# Patient Record
Sex: FEMALE | Race: WHITE | Employment: FULL TIME | URBAN - METROPOLITAN AREA
[De-identification: names, ages, dates, MRNs, and addresses within clinical notes are randomized per-mention and may not be internally consistent; named-entity substitution may affect disease eponyms.]

---

## 2023-10-25 PROBLEM — R53.83 FATIGUE: Status: ACTIVE | Noted: 2023-10-25

## 2023-10-25 PROBLEM — O02.1 MISSED ABORTION (HHS-HCC): Status: ACTIVE | Noted: 2023-10-25

## 2023-10-25 PROBLEM — R63.2 POLYPHAGIA: Status: ACTIVE | Noted: 2023-10-25

## 2023-10-25 PROBLEM — G47.8 SLEEP PARALYSIS: Status: ACTIVE | Noted: 2023-10-25

## 2023-10-25 PROBLEM — F41.8 MIXED ANXIETY DEPRESSIVE DISORDER: Status: ACTIVE | Noted: 2023-10-25

## 2023-10-25 PROBLEM — F17.200 TOBACCO USE DISORDER: Status: ACTIVE | Noted: 2023-10-25

## 2023-10-25 PROBLEM — R41.840 INATTENTION: Status: ACTIVE | Noted: 2023-10-25

## 2023-10-25 PROBLEM — R41.840 POOR CONCENTRATION: Status: ACTIVE | Noted: 2023-10-25

## 2023-10-25 PROBLEM — E55.9 VITAMIN D DEFICIENCY: Status: ACTIVE | Noted: 2023-10-25

## 2023-10-25 PROBLEM — E66.9 OBESITY (BMI 30-39.9): Status: ACTIVE | Noted: 2023-10-25

## 2023-10-25 RX ORDER — LEVONORGESTREL 52 MG/1
INTRAUTERINE DEVICE INTRAUTERINE
COMMUNITY

## 2023-10-25 RX ORDER — SERTRALINE HYDROCHLORIDE 100 MG/1
TABLET, FILM COATED ORAL
COMMUNITY
Start: 2022-10-09 | End: 2023-10-26 | Stop reason: WASHOUT

## 2023-10-25 RX ORDER — METHYLPHENIDATE HYDROCHLORIDE 20 MG/1
CAPSULE, EXTENDED RELEASE ORAL
COMMUNITY
Start: 2023-01-24

## 2023-10-25 RX ORDER — DOCUSATE SODIUM 100 MG/1
CAPSULE, LIQUID FILLED ORAL
COMMUNITY

## 2023-10-25 RX ORDER — BENZOCAINE AND LEVOMENTHOL 200; 5 MG/G; MG/G
SPRAY TOPICAL
COMMUNITY

## 2023-10-25 RX ORDER — METHYLPHENIDATE HYDROCHLORIDE 10 MG/1
CAPSULE, EXTENDED RELEASE ORAL
COMMUNITY
Start: 2023-01-17

## 2023-10-25 RX ORDER — MECLIZINE HCL 12.5 MG 12.5 MG/1
TABLET ORAL
COMMUNITY
Start: 2020-04-27

## 2023-10-26 ENCOUNTER — TELEMEDICINE (OUTPATIENT)
Dept: SURGERY | Facility: CLINIC | Age: 33
End: 2023-10-26
Payer: COMMERCIAL

## 2023-10-26 VITALS — BODY MASS INDEX: 32.27 KG/M2 | HEIGHT: 64 IN | WEIGHT: 189 LBS

## 2023-10-26 DIAGNOSIS — E88.819 INSULIN RESISTANCE: ICD-10-CM

## 2023-10-26 DIAGNOSIS — E66.9 OBESITY (BMI 30-39.9): Primary | ICD-10-CM

## 2023-10-26 DIAGNOSIS — R63.2 POLYPHAGIA: ICD-10-CM

## 2023-10-26 DIAGNOSIS — E63.9 INADEQUATE CALORIC INTAKE: ICD-10-CM

## 2023-10-26 PROCEDURE — 99214 OFFICE O/P EST MOD 30 MIN: CPT

## 2023-10-26 RX ORDER — BUPROPION HYDROCHLORIDE 300 MG/1
300 TABLET ORAL DAILY
COMMUNITY
Start: 2023-08-31 | End: 2023-10-26 | Stop reason: SDUPTHER

## 2023-10-26 RX ORDER — LISDEXAMFETAMINE DIMESYLATE 50 MG/1
50 CAPSULE ORAL EVERY MORNING
COMMUNITY
Start: 2023-10-10

## 2023-10-26 RX ORDER — BUPROPION HYDROCHLORIDE 300 MG/1
300 TABLET ORAL DAILY
Qty: 90 TABLET | Refills: 0 | Status: SHIPPED | OUTPATIENT
Start: 2023-10-26 | End: 2023-12-05

## 2023-10-26 NOTE — PROGRESS NOTES
Subjective     Patient ID: Danae Eagle is a 33 y.o. female who presents for No chief complaint on file..    HPI    31 YO Female with a hx of Obesity, Anxiety, Depression, GERD, Joint Pain and Vitamin D Deficiency presents to the clinic for a Medical Weight Loss Follow-up. Danae would like to reestablish with our program and discuss anti-obesity medication     Currently Danae is on Metformin 500 mg bid to address Insulin Resistance and she started Bupropion 150 mg 2 weeks ago.  Danae denies any n/v/d/c/abdominal discomfort/abdominal pain. She also denies anxiety/jitteriness/depression exacerbation, reports feeling good. She states that she started filling Bupropion finally have accumulated in her system to the point where she started filling somewhat good anorectic effect.     Got diagnosed with sleep apnea - CPAP in route      Initial Visit Weight: 160.9 (2019)  Initial Visit Weight: 202 lbs (09/2022)  Last Visit Weight: 193 lbs  Current Weight: 189 lbs  Total weight loss: 13 lbs  Current BMI: 32     Diet Recall:  Breakfast: none  Lunch: grilled chicken salad, white balsamic dressing  Dinner: chicken pot pie  2 16 ounce bottles of no sugar lemonade  Snack: handful of pistachios  Exercise: Treadmill/Elliptical 60 min twice weekly.      Social: , kids  Occupation: [employer]   Alcohol: Socially   Smoke: Non-smoker  Drugs: No hx of drug use     Family hx significant for: Hearing loss, Lung Cancer, Learning disability   Surgical hx significant for: [place] tooth extraction   Birth control used - Mirena  No h/o kidney stones     Labs  Fasting Insulin - 10  Fasting Glucose - 82  Marcelle-IR - 2.0      Past Medical History:   Diagnosis Date    Developmental disorder of scholastic skills, unspecified 01/03/2017    Learning disability    Other specified behavioral and emotional disorders with onset usually occurring in childhood and adolescence 01/03/2017    ADD (attention deficit disorder) without hyperactivity     "  Past Surgical History:   Procedure Laterality Date    OTHER SURGICAL HISTORY  01/03/2017    Oral Surgery Tooth Extraction [place] Tooth      Family History   Problem Relation Name Age of Onset    Deafness Father      Learning disabilities Sister      Lung cancer Paternal Grandmother        Social History     Tobacco Use   Smoking Status Never   Smokeless Tobacco Never      Social History     Substance and Sexual Activity   Drug Use Never      Social History     Substance and Sexual Activity   Alcohol Use Yes    Comment: socially        Allergies  No Known Allergies     VITALS  Visit Vitals  Ht 1.626 m (5' 4\")   Wt 85.7 kg (189 lb)   BMI 32.44 kg/m²   Smoking Status Never   BSA 1.97 m²        LABS  Lab Results   Component Value Date/Time    CZNSTCCI89 404 11/22/2022 1037    TSH 2.24 11/22/2022 1037    TSH 1.07 09/28/2021 1741    VITD25 36 11/22/2022 1037           No lab exists for component: \"LABALBU\"  Lab Results   Component Value Date    GLUCOSE 82 11/22/2022    CALCIUM 9.4 11/22/2022     11/22/2022    K 4.0 11/22/2022    CO2 26 11/22/2022     11/22/2022    BUN 11 11/22/2022    CREATININE 0.97 11/22/2022       ROS  Review of Systems  GENERAL: Denies fever/chills       HEENT: Denies headache, new or worsening vision and hearing problems, nasal congestion, hoarseness, sore throat             CV: Denies chest pain, palpitations         RESP: Denies SOB, cough, wheezing              GI: Denies n/v, abdominal pain, diarrhea, constipation            : Denies urinary urgency/frequency     NEURO: Denies headache, weakness, numbness, tingling       ENDO: Denies excessive heat/cold, recent weight gain/loss        MUSC:Denies low back pain, joint pain      PSYCH: Denies substance use disorder, anxiety, depression       PHYSICAL EXAM  Physical Exam  General- No acute distress, well appearing and well nourished. Obese  HEENT - WNL  Pulmonary - respiratory effort normal  Skin - no visible rashes or " "lesions  Neurologic -  WNL, no obvious abnormalities   Psychiatric - AXO x3, mood and affect appropriate      ASSESSMENT/PLAN  Patient here for medical weight loss.   Assessment/Plan   Problem List Items Addressed This Visit       Obesity (BMI 30-39.9) - Primary    Polyphagia    Relevant Medications    buPROPion XL (Wellbutrin XL) 300 mg 24 hr tablet     Other Visit Diagnoses       Inadequate caloric intake        Insulin resistance               33 YO Female with Obesity here for Weight Management.   Previous Weight: 202 lbs  Current Weight: 199 lbs  Current BMI: 34  OARRS reviewed; 2/2/2022 last Phen Rx.     Patient has been taking few rounds of Phentermine and she is upset that every time the therapy stops, her weight keeps coming back. Patient was inquiring if there were any other medical options that provide more steady weight maintenance.      Discussed medications to assist with weight loss and appetite suppression.   1. Polyphagia   Currently on Wellbutrin 300 mg once daily. Denies any SE. Reports suboptimal appetite control. It was very good couple weeks after initiation, but now it has lessened even with the higher dose of Wellbutrin.  Will continue with Wellbutrin 300 mg daily.   Risks and benefits discussed.     Danae asked to start Phentermine therapy. OARRS report checked - she was started on Vyvanse 50 mg daily to address her ADD. Discussed with Danae the fact that there is no big increase in appetite control from Phentermine therapy in settings of Vyvanse.   Risks and benefits discussed.      Check if she would like Topiramate.    2. Insulin Resistance/Metabolic Syndrome   Was placed on Metformin 1500 mg daily. Denied any SE. Reported decrease in carbohydrates cravings.   Danae wanted to check if Trulicity is covered, but it was not.    Danae stopped taking Metformin because she \"didn't see the benefits from taking it\"  Risks and benefits discussed.         > 50% of time spent counseling on the " importance of following recommended dietary modifications including: role of diet, low calorie and carbohydrate restrictions, limiting fast food and avoiding high sugary beverages.   Also discussed, the role of exercise with an ultimate goal of at least 250-300 minutes a week for weight loss and weight maintenance.   Patient verbalizes understanding.      RTC 1-2 months

## 2023-10-27 PROBLEM — E63.9 INADEQUATE CALORIC INTAKE: Status: ACTIVE | Noted: 2023-10-27

## 2023-10-27 PROBLEM — E88.819 INSULIN RESISTANCE: Status: ACTIVE | Noted: 2023-10-27

## 2023-10-27 RX ORDER — METFORMIN HYDROCHLORIDE 500 MG/1
500 TABLET ORAL
Qty: 30 TABLET | Refills: 0 | Status: SHIPPED | OUTPATIENT
Start: 2023-10-27 | End: 2023-11-27

## 2023-10-27 RX ORDER — TOPIRAMATE 25 MG/1
25 TABLET ORAL 2 TIMES DAILY
Qty: 60 TABLET | Refills: 0 | Status: SHIPPED | OUTPATIENT
Start: 2023-10-27 | End: 2023-11-27

## 2023-12-04 DIAGNOSIS — R63.2 POLYPHAGIA: ICD-10-CM

## 2023-12-05 RX ORDER — BUPROPION HYDROCHLORIDE 300 MG/1
300 TABLET ORAL DAILY
Qty: 90 TABLET | Refills: 1 | Status: SHIPPED | OUTPATIENT
Start: 2023-12-05 | End: 2024-01-05 | Stop reason: SDUPTHER

## 2023-12-06 ENCOUNTER — APPOINTMENT (OUTPATIENT)
Dept: SLEEP MEDICINE | Facility: CLINIC | Age: 33
End: 2023-12-06
Payer: COMMERCIAL

## 2023-12-08 DIAGNOSIS — R63.2 POLYPHAGIA: ICD-10-CM

## 2023-12-11 RX ORDER — TOPIRAMATE 25 MG/1
25 TABLET ORAL 2 TIMES DAILY
Qty: 60 TABLET | Refills: 0 | Status: SHIPPED | OUTPATIENT
Start: 2023-12-11 | End: 2024-01-05 | Stop reason: SDUPTHER

## 2023-12-21 DIAGNOSIS — E88.819 INSULIN RESISTANCE: ICD-10-CM

## 2023-12-21 RX ORDER — METFORMIN HYDROCHLORIDE 500 MG/1
500 TABLET ORAL
Qty: 30 TABLET | Refills: 0 | Status: SHIPPED | OUTPATIENT
Start: 2023-12-21 | End: 2024-01-20

## 2023-12-21 NOTE — TELEPHONE ENCOUNTER
Rx Refill Request Telephone Encounter    Name:  Danae VERMA Fco  :  [id number]  Medication Name:  Metformin            Specific Pharmacy location:  [facility]  Date of last appointment:  10/26  Date of next appointment:  2024  Best number to reach patient:  [phone]

## 2024-01-05 ENCOUNTER — TELEMEDICINE (OUTPATIENT)
Dept: SURGERY | Facility: HOSPITAL | Age: 34
End: 2024-01-05
Payer: COMMERCIAL

## 2024-01-05 VITALS — HEIGHT: 64 IN | BODY MASS INDEX: 31.58 KG/M2 | WEIGHT: 185 LBS

## 2024-01-05 DIAGNOSIS — R63.2 POLYPHAGIA: ICD-10-CM

## 2024-01-05 DIAGNOSIS — Z71.3 DIETARY COUNSELING: ICD-10-CM

## 2024-01-05 DIAGNOSIS — E66.9 OBESITY (BMI 30-39.9): Primary | ICD-10-CM

## 2024-01-05 DIAGNOSIS — E63.9 INADEQUATE CALORIC INTAKE: ICD-10-CM

## 2024-01-05 DIAGNOSIS — E88.819 INSULIN RESISTANCE: ICD-10-CM

## 2024-01-05 PROCEDURE — 99214 OFFICE O/P EST MOD 30 MIN: CPT | Mod: 95,ZK

## 2024-01-05 PROCEDURE — 99213 OFFICE O/P EST LOW 20 MIN: CPT

## 2024-01-05 RX ORDER — TOPIRAMATE 25 MG/1
25 TABLET ORAL 2 TIMES DAILY
Qty: 60 TABLET | Refills: 2 | Status: SHIPPED | OUTPATIENT
Start: 2024-01-05 | End: 2024-04-04

## 2024-01-05 RX ORDER — BUPROPION HYDROCHLORIDE 300 MG/1
300 TABLET ORAL DAILY
Qty: 90 TABLET | Refills: 0 | Status: SHIPPED | OUTPATIENT
Start: 2024-01-05 | End: 2024-04-04

## 2024-01-05 RX ORDER — TIRZEPATIDE 2.5 MG/.5ML
2.5 INJECTION, SOLUTION SUBCUTANEOUS
Qty: 2 ML | Refills: 1 | Status: SHIPPED | OUTPATIENT
Start: 2024-01-05

## 2024-01-05 NOTE — PROGRESS NOTES
Subjective     Patient ID: Danae Eagle is a 33 y.o. female who presents for FUV on CWL    HPI    33 YO Female with a hx of Obesity, Anxiety, Depression, GERD, Joint Pain and Vitamin D Deficiency presents to the clinic for a Medical Weight Loss Follow-up. Danae would like to reestablish with our program and discuss anti-obesity medication     Currently Danae is on Metformin 500 mg bid to address Insulin Resistance and she started Bupropion 150 mg 2 weeks ago.  Danae denies any n/v/d/c/abdominal discomfort/abdominal pain. She also denies anxiety/jitteriness/depression exacerbation, reports feeling good. She states that she started filling Bupropion finally have accumulated in her system to the point where she started filling somewhat good anorectic effect.     Got diagnosed with sleep apnea - CPAP in route      Initial Visit Weight: 160.9 (2019)  Initial Visit Weight: 202 lbs (09/2022)  Last Visit Weight: 189 lbs  Current Weight: 185 lbs  Total weight loss: 17 lbs  Current BMI: 31     Diet Recall:  Breakfast: none  Lunch: grilled chicken salad, white balsamic dressing  Dinner: chicken pot pie  2 16 ounce bottles of no sugar lemonade  Snack: handful of pistachios  Exercise: Treadmill/Elliptical 60 min twice weekly.      Social: , kids  Occupation: [employer]   Alcohol: Socially   Smoke: Non-smoker  Drugs: No hx of drug use     Family hx significant for: Hearing loss, Lung Cancer, Learning disability   Surgical hx significant for: [place] tooth extraction   Birth control used - Mirena  No h/o kidney stones     Labs  Fasting Insulin - 10  Fasting Glucose - 82  Marcelle-IR - 2.0      Past Medical History:   Diagnosis Date    Developmental disorder of scholastic skills, unspecified 01/03/2017    Learning disability    Other specified behavioral and emotional disorders with onset usually occurring in childhood and adolescence 01/03/2017    ADD (attention deficit disorder) without hyperactivity      Past Surgical  "History:   Procedure Laterality Date    OTHER SURGICAL HISTORY  01/03/2017    Oral Surgery Tooth Extraction [place] Tooth      Family History   Problem Relation Name Age of Onset    Deafness Father      Learning disabilities Sister      Lung cancer Paternal Grandmother        Social History     Tobacco Use   Smoking Status Never   Smokeless Tobacco Never      Social History     Substance and Sexual Activity   Drug Use Never      Social History     Substance and Sexual Activity   Alcohol Use Yes    Comment: socially        Allergies  No Known Allergies     VITALS  Visit Vitals  Ht 1.626 m (5' 4\")   Wt 83.9 kg (185 lb)   BMI 31.76 kg/m²   Smoking Status Never   BSA 1.95 m²        LABS  Lab Results   Component Value Date/Time    [id number] 404 11/22/2022 1037    TSH 2.24 11/22/2022 1037    TSH 1.07 09/28/2021 1741    VITD25 36 11/22/2022 1037           No lab exists for component: \"LABALBU\"  Lab Results   Component Value Date    GLUCOSE 82 11/22/2022    CALCIUM 9.4 11/22/2022     11/22/2022    K 4.0 11/22/2022    CO2 26 11/22/2022     11/22/2022    BUN 11 11/22/2022    CREATININE 0.97 11/22/2022       ROS  Review of Systems  GENERAL: Denies fever/chills       HEENT: Denies headache, new or worsening vision and hearing problems, nasal congestion, hoarseness, sore throat             CV: Denies chest pain, palpitations         RESP: Denies SOB, cough, wheezing              GI: Denies n/v, abdominal pain, diarrhea, constipation            : Denies urinary urgency/frequency     NEURO: Denies headache, weakness, numbness, tingling       ENDO: Denies excessive heat/cold, recent weight gain/loss        MUSC:Denies low back pain, joint pain      PSYCH: Denies substance use disorder, anxiety, depression       PHYSICAL EXAM  Physical Exam  General- No acute distress, well appearing and well nourished. Obese  HEENT - WNL  Pulmonary - respiratory effort normal  Skin - no visible rashes or lesions  Neurologic -  WNL, no " obvious abnormalities   Psychiatric - AXO x3, mood and affect appropriate      ASSESSMENT/PLAN  Patient here for medical weight loss.   Assessment/Plan   Problem List Items Addressed This Visit       Obesity (BMI 30-39.9) - Primary    Polyphagia    Relevant Medications    buPROPion XL (Wellbutrin XL) 300 mg 24 hr tablet    topiramate (Topamax) 25 mg tablet    Inadequate caloric intake    Insulin resistance    Relevant Medications    tirzepatide (Mounjaro) 2.5 mg/0.5 mL pen injector    Dietary counseling      32 YO Female with Obesity here for Weight Management.   Initial Weight: 202 lbs  Previous Weight: 199 lbs  Current Weight: 185 lbs  Total Weight Loss: 17 lbs  Current BMI: 34  OARRS reviewed; 2/2/2022 last Phen Rx.     Patient has been taking few rounds of Phentermine and she is upset that every time the therapy stops, her weight keeps coming back. Patient was inquiring if there were any other medical options that will provide more steady weight maintenance.      Discussed medications to assist with weight loss and appetite suppression.   1. Polyphagia   Currently on Wellbutrin 300 mg once daily. Denies any SE. Reports suboptimal appetite control. It was very good couple weeks after initiation, but now it has lessened.  Will continue with Wellbutrin 300 mg daily.   Risks and benefits discussed.     Danae asked to start Phentermine therapy. OARRS report checked - she was started on Vyvanse 50 mg daily to address her ADD. Discussed with Danae the fact that we will stay off Phentermine while she is on Vyvanse therapy.   Risks and benefits discussed.      Discussed alternative medications to assist with weight loss and appetite suppression.   After discussing all medication options, we decided to start Topiramate.   Risks and benefits discussed.   Emphasis on fetal abnormalities in women of childbearing age; specifically cleft palate/lip. Reinforced the importance of utilizing some form of birth control while  "taking Topiramate.     2. Insulin Resistance/Metabolic Syndrome   Was placed on Metformin 1500 mg daily. Denied any SE. Reported decrease in carbohydrates cravings.   Danae wanted to check if Trulicity is covered, but it was not.    Danae stopped taking Metformin because she \"didn't see the benefits from taking it\"  Risks and benefits discussed.     Danae thinks that Tirzepatide is covered.  Discussed medication to address Insulin Resistance and off label weight loss.  Mounjaro 2.5 mg is initiated.  Risks and benefits discussed.  Danae denies personal and family history of Pancreatitis and MTC/MEN2.       > 50% of time spent counseling on the importance of following recommended dietary modifications including: role of diet, low calorie and carbohydrate restrictions, limiting fast food and avoiding high sugary beverages.   Also discussed, the role of exercise with an ultimate goal of at least 250-300 minutes a week for weight loss and weight maintenance.   Patient verbalizes understanding.      RTC 1-2 months     "

## 2024-01-09 ENCOUNTER — TELEPHONE (OUTPATIENT)
Dept: SURGERY | Facility: CLINIC | Age: 34
End: 2024-01-09

## 2024-01-09 NOTE — TELEPHONE ENCOUNTER
Joellen,     I have updated this patient's insurance information. It has also been verified by the system. Just an fyi.

## 2024-01-10 PROBLEM — Z71.3 DIETARY COUNSELING: Status: ACTIVE | Noted: 2024-01-10

## 2024-01-11 NOTE — PATIENT INSTRUCTIONS
You have been prescribed Topiramate for weight loss.   This medication will decrease your appetite.    Possible Side Effects include: tingling of the arms and legs, nausea, a change in the way foods taste, diarrhea, nervousness, fogginess, upper respiratory tract infection.     This medication is absolutely contraindicated in pregnancy. Topiramate has been associated with the development of cleft palate as well as cleft lip in neonates. These developmental abnormalities often occur before a woman even knows she is pregnant. Some form of birth control must be utilized (oral contraceptives or barrier method, etc) while taking this medication.     You have been prescribed Mounjaro (Tirzipetide) to address Insulin Resistance and off label weight loss.   - Mounjaro is a glucose-dependent insulinotropic polypeptide (GIP) receptor and glucagon-like peptide-1 (GLP-1) receptor agonist indicated as an adjunct to diet and exercise to improve glycemic control in adults with type 2 diabetes mellitus.  DOSAGE AND ADMINISTRATION:   - Administer Mounjaro once weekly, on the same day each week, at any time of day, with or without meals.   - Inject subcutaneously in the abdomen, thigh or upper arm.  - The recommended starting dosage is 2.5 mg injected subcutaneously once weekly x 4 weeks  - After 4 weeks, increase to 5 mg injected subcutaneously once weekly.   - If additional glycemic control is needed, increase the dosage in 2.5 mg increments after at least 4 weeks on the current dose.  - The maximum dosage is 15 mg subcutaneously once weekly.  CONTRAINDICATIONS  - Personal or family history of medullary thyroid carcinoma or in patients with Multiple Endocrine Neoplasia syndrome type 2.   WARNINGS AND PRECAUTIONS   - Pancreatitis: Has been reported in clinical trials. Discontinue promptly if pancreatitis is suspected.   - Hypoglycemia with Concomitant Use of Insulin Secretagogues or Insulin: Concomitant use with an insulin  secretagogue or insulin may increase the risk of hypoglycemia, including severe hypoglycemia. Reducing dose of insulin secretagogue or insulin may be necessary.   - Hypersensitivity Reactions: Hypersensitivity reactions have been reported. Discontinue MOUNJARO if suspected.   - Acute Kidney Injury: Monitor renal function in patients with renal impairment reporting severe adverse gastrointestinal reactions.   - Severe Gastrointestinal Disease: Use may be associated with gastrointestinal adverse reactions, sometimes severe. Has not been studied in patients with severe gastrointestinal disease and is not recommended in these patients.   - Diabetic Retinopathy Complications in patients with a History of Diabetic Retinopathy:   - Acute Gallbladder Disease: Has occurred in clinical trials. If cholelithiasis is suspected, gallbladder studies and clinical follow-up are indicated.   ADVERSE REACTIONS  - The most common adverse reactions, reported in =5% of patients treated with MOUNJARO are: nausea, diarrhea, decreased appetite, vomiting, constipation, dyspepsia, and abdominal pain.     *For Constipation--> take a fiber supplement or stool softener daily. Make sure you are drinking at least 64oz of water daily.  *For Nausea--> Eat small, frequent meals throughout the day. If nausea persists, please call the office.    Please go to [email or web address] and download a savings card. You will need this information and your current insurance information when you call [facility] to schedule delivery of your medication.      Diet Recommendations:  Keep a food journal and log everything you eat and drink. You can use a phone applications like Tubis, Lose it, a notebook, or the provided meal calendar.   Eat between 6288-8214 calories per day; meal plan provided to you this visit.  Consume less than 100 carbohydrates per day. Examples of carbohydrates include: bread, pasta, cakes, cookies, rice, cereal, fruit drinks, juice, soda and  fruit.   Consume no more than 1 fruit per day.                 Eat 3 meals and 1 snack. Each meal should have 3-4 oz of protein and vegetables. Limit starches.  Eat on a schedule and do not skip meals.  Meal Plan & Grocery List provided to you today.     Exercise Recommendations:   Start with low-moderate intensity to avoid injury and to promote adherence.  Aim for 30 minutes per day, 5 days per week to start, with progression over several weeks to more vigorous intensity.   Emphasize increasing duration, rather than intensity initially.     Return to the clinic: 1-2 months.

## 2024-01-18 NOTE — TELEPHONE ENCOUNTER
Spoke with patient, name and  verified:     Patient was informed that insurance was contacted in regards to Mounjaro being denied. Patient is requesting an appeal or would like to discuss a new therapy.

## 2024-03-19 ENCOUNTER — APPOINTMENT (OUTPATIENT)
Dept: SURGERY | Facility: CLINIC | Age: 34
End: 2024-03-19
Payer: COMMERCIAL

## 2024-04-19 ENCOUNTER — APPOINTMENT (OUTPATIENT)
Dept: SURGERY | Facility: HOSPITAL | Age: 34
End: 2024-04-19
Payer: COMMERCIAL

## 2024-06-18 ENCOUNTER — APPOINTMENT (OUTPATIENT)
Dept: PRIMARY CARE | Facility: CLINIC | Age: 34
End: 2024-06-18
Payer: COMMERCIAL

## 2024-06-18 ENCOUNTER — OFFICE VISIT (OUTPATIENT)
Dept: PRIMARY CARE | Facility: CLINIC | Age: 34
End: 2024-06-18
Payer: COMMERCIAL

## 2024-06-18 VITALS
BODY MASS INDEX: 31.72 KG/M2 | HEIGHT: 64 IN | WEIGHT: 185.8 LBS | HEART RATE: 82 BPM | SYSTOLIC BLOOD PRESSURE: 104 MMHG | TEMPERATURE: 98.1 F | DIASTOLIC BLOOD PRESSURE: 64 MMHG | OXYGEN SATURATION: 99 %

## 2024-06-18 DIAGNOSIS — F90.0 ATTENTION DEFICIT HYPERACTIVITY DISORDER (ADHD), PREDOMINANTLY INATTENTIVE TYPE: ICD-10-CM

## 2024-06-18 DIAGNOSIS — F41.8 MIXED ANXIETY DEPRESSIVE DISORDER: Primary | ICD-10-CM

## 2024-06-18 PROBLEM — G47.33 OBSTRUCTIVE SLEEP APNEA SYNDROME IN ADULT: Status: ACTIVE | Noted: 2023-06-20

## 2024-06-18 PROBLEM — E88.810 METABOLIC SYNDROME: Status: ACTIVE | Noted: 2024-06-18

## 2024-06-18 PROBLEM — O02.1 MISSED ABORTION (HHS-HCC): Status: RESOLVED | Noted: 2023-10-25 | Resolved: 2024-06-18

## 2024-06-18 PROBLEM — G47.10 HYPERSOMNIA: Status: ACTIVE | Noted: 2024-06-18

## 2024-06-18 PROCEDURE — 1036F TOBACCO NON-USER: CPT | Performed by: FAMILY MEDICINE

## 2024-06-18 PROCEDURE — 99214 OFFICE O/P EST MOD 30 MIN: CPT | Performed by: FAMILY MEDICINE

## 2024-06-18 RX ORDER — BUPROPION HYDROCHLORIDE 150 MG/1
150 TABLET ORAL EVERY MORNING
Qty: 14 TABLET | Refills: 0 | Status: SHIPPED | OUTPATIENT
Start: 2024-06-18 | End: 2024-07-02

## 2024-06-18 RX ORDER — SERTRALINE HYDROCHLORIDE 100 MG/1
100 TABLET, FILM COATED ORAL SEE ADMIN INSTRUCTIONS
Qty: 90 TABLET | Refills: 0 | Status: SHIPPED | OUTPATIENT
Start: 2024-06-18 | End: 2024-09-16

## 2024-06-18 ASSESSMENT — PATIENT HEALTH QUESTIONNAIRE - PHQ9
1. LITTLE INTEREST OR PLEASURE IN DOING THINGS: NOT AT ALL
2. FEELING DOWN, DEPRESSED OR HOPELESS: NOT AT ALL
SUM OF ALL RESPONSES TO PHQ9 QUESTIONS 1 AND 2: 0

## 2024-06-18 ASSESSMENT — PAIN SCALES - GENERAL: PAINLEVEL: 0-NO PAIN

## 2024-06-18 NOTE — PROGRESS NOTES
Outpatient Visit Note    Chief Complaint   Patient presents with    New Patient Visit         HPI:  Danae Eagle is a 33 y.o. female who presents to the office to establish with new PCP, having previously been established with Dr. Rudolph.    She was last seen by previous PCP in September 2021 via telemedicine encounter secondary to concerns regarding weight gain.  At that time, thyroid level was checked and generally unremarkable.    Chart review notes a past medical history significant for anxiety/depression and ADHD to which patient has been actively following with psychiatry.  Is currently on regimen of Vyvanse and Wellbutrin.    She reports interest in establishing care, having not been seen by her primary care provider in some time.  Has most recently been following with a weight loss specialist.  Was attempting to to be placed on injectable weight loss medication due to insurance.  And leading to this option, patient had been switched off of previous regimen of sertraline 100 mg daily to Wellbutrin, with hopes that this may help with hunger cravings.  States that the medication has been tolerated though the effectiveness in regards to mood has been lacking.  Does feel to have done better on sertraline and expresses interest in wanting to return.    Separately, she has been on stimulant therapy for ADHD for many years, with her current Vyvanse regimen being stable over the course of the last 2 years.  Does currently have established relationship with psychiatrist 1 hour away.  Is interested in seeing if this regimen can be managed through our office.  Denies any fevers, jitteriness, insomnia or loss of appetite.    Current Medications  Current Outpatient Medications   Medication Instructions    buPROPion XL (WELLBUTRIN XL) 300 mg, oral, Daily    buPROPion XL (WELLBUTRIN XL) 150 mg, oral, Every morning, Do not crush, chew, or split.    levonorgestrel (Mirena) 21 mcg/24 hours (8 yrs) 52 mg IUD Mirena (52  MG) IUD   Refills: 0       Active    sertraline (ZOLOFT) 100 mg, oral, See admin instructions, Take 1/2 tablet (50 mg) daily x 2 weeks after discontinuing Wellbutrin then increase to 1 tablet (100mg) daily    Vyvanse 50 mg, oral, Every morning        Allergies  No Known Allergies     Past Medical History:   Diagnosis Date    ADHD (attention deficit hyperactivity disorder)     Anxiety     Depression     Developmental disorder of scholastic skills, unspecified 01/03/2017    Learning disability    Other specified behavioral and emotional disorders with onset usually occurring in childhood and adolescence 01/03/2017    ADD (attention deficit disorder) without hyperactivity      Past Surgical History:   Procedure Laterality Date    OTHER SURGICAL HISTORY  01/03/2017    Oral Surgery Tooth Extraction [place] Tooth     Family History   Problem Relation Name Age of Onset    Deafness Father      Learning disabilities Sister      Lung cancer Paternal Grandmother       Social History     Tobacco Use    Smoking status: Never    Smokeless tobacco: Never   Vaping Use    Vaping status: Never Used   Substance Use Topics    Alcohol use: Yes     Comment: socially    Drug use: Never       ROS  All pertinent positive symptoms are included in the history of present illness.  All other systems have been reviewed and are negative and noncontributory to this patient's current ailments.    PHQ9/GAD7:        VITAL SIGNS  Vitals:    06/18/24 1502   BP: 104/64   Pulse: 82   Temp: 36.7 °C (98.1 °F)   SpO2: 99%       PHYSICAL EXAM  GENERAL APPEARANCE: alert and oriented, Pleasant and cooperative, No Acute Distress  HEENT: EOMI, PERRLA, MMM  HEART: RRR, normal S1S2, no murmurs, click or rubs  LUNGS: clear to auscultation bilaterally, no wheezes/rhonchi/rales  EXTREMITIES: no edema, normal ROM  SKIN: normal, no rash, unremarkable  NEUROLOGIC EXAM: non-focal exam  MUSCULOSKELETAL: no gross abnormalities  PSYCH: affect is normal, eye contact is  good    Assessment/Plan   Problem List Items Addressed This Visit             ICD-10-CM    Mixed anxiety depressive disorder - Primary F41.8     - Will plan to transition from Wellbutrin converting to 150 mg dose daily x 2 weeks then discontinuing to start sertraline 50 mg.  Will plan for 50 mg dose of sertraline x 2 weeks then increase to 100 mg daily.  Will ultimately plan for follow-up in 6 weeks to check on tolerance/effectiveness or earlier visits as needed         Relevant Medications    buPROPion XL (Wellbutrin XL) 150 mg 24 hr tablet    sertraline (Zoloft) 100 mg tablet    Attention deficit hyperactivity disorder (ADHD), predominantly inattentive type F90.0     - Will plan to transition medication management to our office, to which we will have you call when next prescription refill as needed            Counseling:       Medication education:         Education:  The patient is counseled regarding potential side-effects of all new medications        Understanding:  Patient expressed understanding        Adherence:  No barriers to adherence identified    ** Please excuse any errors in grammar or translation related to this dictation. Voice recognition software was utilized to prepare this document. **

## 2024-06-18 NOTE — ASSESSMENT & PLAN NOTE
- Will plan to transition from Wellbutrin converting to 150 mg dose daily x 2 weeks then discontinuing to start sertraline 50 mg.  Will plan for 50 mg dose of sertraline x 2 weeks then increase to 100 mg daily.  Will ultimately plan for follow-up in 6 weeks to check on tolerance/effectiveness or earlier visits as needed   IV discontinued, cath removed intact

## 2024-06-18 NOTE — PATIENT INSTRUCTIONS
Problem List Items Addressed This Visit             ICD-10-CM    Mixed anxiety depressive disorder - Primary F41.8     - Will plan to transition from Wellbutrin converting to 150 mg dose daily x 2 weeks then discontinuing to start sertraline 50 mg.  Will plan for 50 mg dose of sertraline x 2 weeks then increase to 100 mg daily.  Will ultimately plan for follow-up in 6 weeks to check on tolerance/effectiveness or earlier visits as needed         Relevant Medications    buPROPion XL (Wellbutrin XL) 150 mg 24 hr tablet    sertraline (Zoloft) 100 mg tablet    Attention deficit hyperactivity disorder (ADHD), predominantly inattentive type F90.0     - Will plan to transition medication management to our office, to which we will have you call when next prescription refill as needed            Counseling:       Medication education:         Education:  The patient is counseled regarding potential side-effects of all new medications        Understanding:  Patient expressed understanding        Adherence:  No barriers to adherence identified    ** Please excuse any errors in grammar or translation related to this dictation. Voice recognition software was utilized to prepare this document. **

## 2024-06-18 NOTE — ASSESSMENT & PLAN NOTE
- Will plan to transition medication management to our office, to which we will have you call when next prescription refill as needed

## 2024-07-11 DIAGNOSIS — F41.8 MIXED ANXIETY DEPRESSIVE DISORDER: ICD-10-CM

## 2024-07-11 DIAGNOSIS — F90.0 ATTENTION DEFICIT HYPERACTIVITY DISORDER (ADHD), PREDOMINANTLY INATTENTIVE TYPE: Primary | ICD-10-CM

## 2024-07-16 RX ORDER — LISDEXAMFETAMINE DIMESYLATE 50 MG/1
50 CAPSULE ORAL EVERY MORNING
Qty: 30 CAPSULE | Refills: 0 | Status: SHIPPED | OUTPATIENT
Start: 2024-07-16 | End: 2024-08-15

## 2024-07-16 RX ORDER — LISDEXAMFETAMINE DIMESYLATE 50 MG/1
50 CAPSULE ORAL EVERY MORNING
Qty: 30 CAPSULE | Refills: 0 | Status: CANCELLED | OUTPATIENT
Start: 2024-07-16

## 2024-07-16 RX ORDER — LISDEXAMFETAMINE DIMESYLATE 50 MG/1
50 CAPSULE ORAL EVERY MORNING
Qty: 30 CAPSULE | Refills: 0 | Status: SHIPPED | OUTPATIENT
Start: 2024-09-14 | End: 2024-10-14

## 2024-07-16 RX ORDER — LISDEXAMFETAMINE DIMESYLATE 50 MG/1
50 CAPSULE ORAL EVERY MORNING
Qty: 30 CAPSULE | Refills: 0 | Status: SHIPPED | OUTPATIENT
Start: 2024-08-15 | End: 2024-09-14

## 2024-08-21 ENCOUNTER — TELEPHONE (OUTPATIENT)
Dept: PRIMARY CARE | Facility: CLINIC | Age: 34
End: 2024-08-21
Payer: COMMERCIAL

## 2024-08-21 DIAGNOSIS — F90.0 ATTENTION DEFICIT HYPERACTIVITY DISORDER (ADHD), PREDOMINANTLY INATTENTIVE TYPE: ICD-10-CM

## 2024-08-21 NOTE — TELEPHONE ENCOUNTER
Patient request refill of Vyvance 50mg to be sent to [facility] in [place].  Patient took last pill today.  Thank you

## 2024-08-22 NOTE — TELEPHONE ENCOUNTER
Pt had new refill to start on 8/15/24. Pt notified to call pharmacy to make sure they have this months refill.

## 2024-09-12 ENCOUNTER — TELEPHONE (OUTPATIENT)
Dept: PRIMARY CARE | Facility: CLINIC | Age: 34
End: 2024-09-12
Payer: COMMERCIAL

## 2024-09-12 DIAGNOSIS — R30.0 DYSURIA: Primary | ICD-10-CM

## 2024-09-12 RX ORDER — NITROFURANTOIN 25; 75 MG/1; MG/1
100 CAPSULE ORAL 2 TIMES DAILY
Qty: 10 CAPSULE | Refills: 0 | Status: SHIPPED | OUTPATIENT
Start: 2024-09-12 | End: 2024-09-17

## 2024-09-12 NOTE — TELEPHONE ENCOUNTER
Patient is calling stating she is having symptoms of a UTI. She is having frequent feeling of urination, has a  strong smell to it and is very dark in color. She is also feeling a bit discomfort after urinating. Patient was advised to the Urgent Care for treatment, however she is asking a message be sent first to the  Asking for an antibiotic to be called in for her . PT # [phone]

## 2024-09-19 DIAGNOSIS — F41.8 MIXED ANXIETY DEPRESSIVE DISORDER: ICD-10-CM

## 2024-09-20 RX ORDER — SERTRALINE HYDROCHLORIDE 100 MG/1
100 TABLET, FILM COATED ORAL DAILY
Qty: 90 TABLET | Refills: 0 | Status: SHIPPED | OUTPATIENT
Start: 2024-09-20 | End: 2024-12-19

## 2024-09-20 NOTE — TELEPHONE ENCOUNTER
Prescription 90-day prescription refill sent the patient is due for a follow-up to check on bowel regimen is doing.  Please encourage her to schedule at earliest convenience

## 2024-10-23 ENCOUNTER — TELEPHONE (OUTPATIENT)
Dept: PRIMARY CARE | Facility: CLINIC | Age: 34
End: 2024-10-23
Payer: COMMERCIAL

## 2024-10-23 DIAGNOSIS — F90.0 ATTENTION DEFICIT HYPERACTIVITY DISORDER (ADHD), PREDOMINANTLY INATTENTIVE TYPE: Primary | ICD-10-CM

## 2024-10-23 RX ORDER — LISDEXAMFETAMINE DIMESYLATE 50 MG/1
50 CAPSULE ORAL EVERY MORNING
Qty: 30 CAPSULE | Refills: 0 | Status: SHIPPED | OUTPATIENT
Start: 2024-12-22 | End: 2025-01-21

## 2024-10-23 RX ORDER — LISDEXAMFETAMINE DIMESYLATE 50 MG/1
50 CAPSULE ORAL EVERY MORNING
Qty: 30 CAPSULE | Refills: 0 | Status: SHIPPED | OUTPATIENT
Start: 2024-11-22 | End: 2024-12-22

## 2024-10-23 RX ORDER — LISDEXAMFETAMINE DIMESYLATE 50 MG/1
50 CAPSULE ORAL EVERY MORNING
Qty: 30 CAPSULE | Refills: 0 | Status: SHIPPED | OUTPATIENT
Start: 2024-10-23 | End: 2024-11-22

## 2024-10-23 RX ORDER — LISDEXAMFETAMINE DIMESYLATE 50 MG/1
50 CAPSULE ORAL EVERY MORNING
Status: CANCELLED | OUTPATIENT
Start: 2024-10-23

## 2024-10-23 NOTE — TELEPHONE ENCOUNTER
Patient request refill of Vyvanse sent to [facility] in [place].    Next OV: 10/29/2024

## 2024-10-23 NOTE — TELEPHONE ENCOUNTER
OARRS reviewed with prescription sent for the next 3 months.  Will see patient at follow-up appointment as scheduled

## 2024-11-29 DIAGNOSIS — F41.8 MIXED ANXIETY DEPRESSIVE DISORDER: ICD-10-CM

## 2024-12-02 RX ORDER — SERTRALINE HYDROCHLORIDE 100 MG/1
100 TABLET, FILM COATED ORAL DAILY
Qty: 90 TABLET | Refills: 0 | Status: SHIPPED | OUTPATIENT
Start: 2024-12-02

## 2024-12-02 NOTE — TELEPHONE ENCOUNTER
90-day prescription refill sent.  Patient is due for office follow-up.  Please encourage her to schedule at earliest convenience

## 2025-02-06 DIAGNOSIS — F41.8 MIXED ANXIETY DEPRESSIVE DISORDER: ICD-10-CM

## 2025-02-06 DIAGNOSIS — F90.0 ATTENTION DEFICIT HYPERACTIVITY DISORDER (ADHD), PREDOMINANTLY INATTENTIVE TYPE: ICD-10-CM

## 2025-02-06 RX ORDER — LISDEXAMFETAMINE DIMESYLATE 50 MG/1
50 CAPSULE ORAL EVERY MORNING
Qty: 30 CAPSULE | Refills: 0 | Status: SHIPPED | OUTPATIENT
Start: 2025-02-06 | End: 2025-03-08

## 2025-02-06 RX ORDER — SERTRALINE HYDROCHLORIDE 100 MG/1
100 TABLET, FILM COATED ORAL DAILY
Qty: 90 TABLET | Refills: 0 | OUTPATIENT
Start: 2025-02-06

## 2025-02-06 RX ORDER — SERTRALINE HYDROCHLORIDE 100 MG/1
100 TABLET, FILM COATED ORAL DAILY
Qty: 90 TABLET | Refills: 0 | Status: SHIPPED | OUTPATIENT
Start: 2025-02-06

## 2025-02-06 NOTE — TELEPHONE ENCOUNTER
Refill Request:    Vyvanse 50 mg capsule Take 1 capsule (50 mg) by mouth once daily in the morning. Do not fill before December 22, 2024.     sertraline (Zoloft) 100 mg tablet TAKE 1 TABLET BY MOUTH EVERY DAY     PHARMACY:  [facility] #[id number] - [place], [place] - [address] Phone: [phone]   Fax: [phone]        Last OV-06/18/24,New PT    Next OV-02/11/25, medication follow up    Danae's # [phone]

## 2025-02-11 ENCOUNTER — TELEMEDICINE (OUTPATIENT)
Dept: PRIMARY CARE | Facility: CLINIC | Age: 35
End: 2025-02-11
Payer: COMMERCIAL

## 2025-02-11 DIAGNOSIS — F41.8 MIXED ANXIETY DEPRESSIVE DISORDER: Primary | ICD-10-CM

## 2025-02-11 DIAGNOSIS — F90.0 ATTENTION DEFICIT HYPERACTIVITY DISORDER (ADHD), PREDOMINANTLY INATTENTIVE TYPE: ICD-10-CM

## 2025-02-11 PROCEDURE — 99214 OFFICE O/P EST MOD 30 MIN: CPT | Performed by: FAMILY MEDICINE

## 2025-02-11 PROCEDURE — 1036F TOBACCO NON-USER: CPT | Performed by: FAMILY MEDICINE

## 2025-02-11 RX ORDER — LISDEXAMFETAMINE DIMESYLATE 50 MG/1
50 CAPSULE ORAL EVERY MORNING
Qty: 30 CAPSULE | Refills: 0 | Status: SHIPPED | OUTPATIENT
Start: 2025-03-06 | End: 2025-04-05

## 2025-02-11 RX ORDER — LISDEXAMFETAMINE DIMESYLATE 50 MG/1
50 CAPSULE ORAL EVERY MORNING
Qty: 30 CAPSULE | Refills: 0 | Status: SHIPPED | OUTPATIENT
Start: 2025-04-03 | End: 2025-05-03

## 2025-02-11 RX ORDER — LISDEXAMFETAMINE DIMESYLATE 50 MG/1
50 CAPSULE ORAL EVERY MORNING
Qty: 30 CAPSULE | Refills: 0 | Status: SHIPPED | OUTPATIENT
Start: 2025-05-01 | End: 2025-05-31

## 2025-02-11 RX ORDER — SERTRALINE HYDROCHLORIDE 100 MG/1
100 TABLET, FILM COATED ORAL DAILY
Qty: 90 TABLET | Refills: 3 | Status: SHIPPED | OUTPATIENT
Start: 2025-02-11 | End: 2026-02-11

## 2025-02-11 ASSESSMENT — PATIENT HEALTH QUESTIONNAIRE - PHQ9
SUM OF ALL RESPONSES TO PHQ9 QUESTIONS 1 AND 2: 0
2. FEELING DOWN, DEPRESSED OR HOPELESS: NOT AT ALL
1. LITTLE INTEREST OR PLEASURE IN DOING THINGS: NOT AT ALL

## 2025-02-11 ASSESSMENT — PAIN SCALES - GENERAL: PAINLEVEL_OUTOF10: 0-NO PAIN

## 2025-02-11 NOTE — ASSESSMENT & PLAN NOTE
- I am glad that you have had good response to sertraline with effective management of mood on the 100 mg dose  -Will continue with plans to closely monitor ongoing effectiveness

## 2025-02-11 NOTE — PROGRESS NOTES
Outpatient Visit Note    Chief Complaint   Patient presents with    Follow-up     Med f/u       With patient's permission, this is a Telemedicine visit with video and audio. The provider and patient participated in this telemedicine encounter.    HPI:  Danae Eagle is a 34 y.o. female who presents to the office via telemedicine encounter for medication follow-up.  She was last seen in the office in June 2024 as a new patient to establish with new PCP, having previously been established with Dr. Rudolph.    She was last seen by previous PCP in September 2021 via telemedicine encounter secondary to concerns regarding weight gain.  At that time, thyroid level was checked and generally unremarkable.    Past medical history significant for anxiety/depression and ADHD to which patient had been actively following with psychiatry.  Upon establishing care, patient had been on regimen of Vyvanse and Wellbutrin.    Had most recently been following with a weight loss specialist.  Was attempting to to be placed on injectable weight loss medication due to insurance.  In this process,, patient had been switched off of previous regimen of sertraline 100 mg daily to Wellbutrin, with hopes that this may help with hunger cravings.  Stated that the medication had been tolerated though the effectiveness in regards to mood has been lacking.  She felt to have done better on sertraline to which she was tapered off of Wellbutrin and return to previous sertraline 100 mg.  In general, adjustment to sertraline went well to which she notes good response to medication.    Separately, she has been on stimulant therapy for ADHD for many years, with her current Vyvanse regimen being stable over the course of the last 2 years.  Noted that her established psychiatrist was 1 hour away with discussions to have management of medication taken over by office.  She stated the medication continue to be effective in regards to attention/focus with no  reported adverse side effects.  Has continued to be compliant with regimen with ongoing success.  No reports of jitteriness loss of appetite, palpitations or insomnia.  States that the effectiveness of the medication has slightly changed over time though she overall feels comfortable with current dose.        Current Medications  Current Outpatient Medications   Medication Instructions    levonorgestrel (Mirena) 21 mcg/24 hours (8 yrs) 52 mg IUD Mirena (52 MG) IUD   Refills: 0       Active    [START ON 3/6/2025] lisdexamfetamine (VYVANSE) 50 mg, oral, Every morning    [START ON 4/3/2025] lisdexamfetamine (VYVANSE) 50 mg, oral, Every morning    [START ON 5/1/2025] lisdexamfetamine (VYVANSE) 50 mg, oral, Every morning    sertraline (ZOLOFT) 100 mg, oral, Daily    Vyvanse 50 mg, oral, Every morning    Vyvanse 50 mg, oral, Every morning    Vyvanse 50 mg, oral, Every morning        Allergies  No Known Allergies     Past Medical History:   Diagnosis Date    ADHD (attention deficit hyperactivity disorder)     Anxiety     Depression     Developmental disorder of scholastic skills, unspecified 01/03/2017    Learning disability    Other specified behavioral and emotional disorders with onset usually occurring in childhood and adolescence 01/03/2017    ADD (attention deficit disorder) without hyperactivity      Past Surgical History:   Procedure Laterality Date    OTHER SURGICAL HISTORY  01/03/2017    Oral Surgery Tooth Extraction [place] Tooth     Family History   Problem Relation Name Age of Onset    Deafness Father      Learning disabilities Sister      Lung cancer Paternal Grandmother       Social History     Tobacco Use    Smoking status: Never    Smokeless tobacco: Never   Vaping Use    Vaping status: Never Used   Substance Use Topics    Alcohol use: Yes     Comment: socially    Drug use: Never     Tobacco Use: Low Risk  (2/11/2025)    Patient History     Smoking Tobacco Use: Never     Smokeless Tobacco Use: Never      Passive Exposure: Not on file        ROS  All pertinent positive symptoms are included in the history of present illness.  All other systems have been reviewed and are negative and noncontributory to this patient's current ailments.    VITAL SIGNS  There were no vitals filed for this visit.  There were no vitals filed for this visit.   There is no height or weight on file to calculate BMI.   Patient is unable to provide    PHYSICAL EXAM  GENERAL APPEARANCE:  Alert and oriented x 3, Pleasant and cooperative, No acute distress.   LUNGS:  No conversational dyspnea or cough during encounter.   PSYCH:  appropriate mood and affect, no difficulty with speech.   Telemedicine visit, no other exam component done.      Assessment/Plan   Problem List Items Addressed This Visit             ICD-10-CM    Mixed anxiety depressive disorder - Primary F41.8     - I am glad that you have had good response to sertraline with effective management of mood on the 100 mg dose  -Will continue with plans to closely monitor ongoing effectiveness         Relevant Medications    sertraline (Zoloft) 100 mg tablet    Attention deficit hyperactivity disorder (ADHD), predominantly inattentive type F90.0     - Reports good response to current medication regimen  -Will continue without modification         Relevant Medications    lisdexamfetamine (Vyvanse) 50 mg capsule (Start on 3/6/2025)    lisdexamfetamine (Vyvanse) 50 mg capsule (Start on 4/3/2025)    lisdexamfetamine (Vyvanse) 50 mg capsule (Start on 5/1/2025)       Counseling:       Medication education:         Education:  The patient is counseled regarding potential side-effects of all new medications        Understanding:  Patient expressed understanding        Adherence:  No barriers to adherence identified    ** Please excuse any errors in grammar or translation related to this dictation. Voice recognition software was utilized to prepare this document. **

## 2025-02-11 NOTE — PATIENT INSTRUCTIONS
Problem List Items Addressed This Visit             ICD-10-CM    Mixed anxiety depressive disorder - Primary F41.8     - I am glad that you have had good response to sertraline with effective management of mood on the 100 mg dose  -Will continue with plans to closely monitor ongoing effectiveness         Relevant Medications    sertraline (Zoloft) 100 mg tablet    Attention deficit hyperactivity disorder (ADHD), predominantly inattentive type F90.0     - Reports good response to current medication regimen  -Will continue without modification         Relevant Medications    lisdexamfetamine (Vyvanse) 50 mg capsule (Start on 3/6/2025)    lisdexamfetamine (Vyvanse) 50 mg capsule (Start on 4/3/2025)    lisdexamfetamine (Vyvanse) 50 mg capsule (Start on 5/1/2025)       Counseling:       Medication education:         Education:  The patient is counseled regarding potential side-effects of all new medications        Understanding:  Patient expressed understanding        Adherence:  No barriers to adherence identified    ** Please excuse any errors in grammar or translation related to this dictation. Voice recognition software was utilized to prepare this document. **

## 2025-05-19 ENCOUNTER — TELEPHONE (OUTPATIENT)
Dept: PRIMARY CARE | Facility: CLINIC | Age: 35
End: 2025-05-19
Payer: COMMERCIAL

## 2025-05-19 DIAGNOSIS — Z51.81 THERAPEUTIC DRUG MONITORING: Primary | ICD-10-CM

## 2025-05-19 NOTE — TELEPHONE ENCOUNTER
MED REFILL    LOV; 24  NOV; NOT SCHEDULED    Dispense Quantity: 30 capsule (30 day supply)     lisdexamfetamine (Vyvanse) 50 mg capsule Take 1 capsule (50 mg) by mouth once daily in the morning. Do not fill before 2025.   Number of times this order has been changed since signin        [id number] - [place], [place] - [address] [[id number]]

## 2025-05-19 NOTE — TELEPHONE ENCOUNTER
LEANDRA    PT WAS UPSET BECAUSE NOW THAT DR ALVAREZ IS NO LONGER HERE AND NOW SHE DOES NOT HAVE A DR ANYMORE, I ADVISED PT THAT I UNDERSTAND HER FRUSTRATION AND THE INCOVIENCE BUT WE UNFORTUNATELY DO NOT JUST ASSIGN A DR TO A PT, ITS THE PT RESPONSIBILITY TO FIND ANOTHER PCP AND DR INFORMED PT'S MONTHS IN ADVANCE TO GIVE THEM SOME TIME, PT ASKED FOR MY NAME AND I GAVE IT TO HER BUT SHE WASN'T HAPPY ABOUT NOT HAVING A PCP. I ALSO INFORMED HER THAT WE HAVE A NEW PCP IN OFFICE BUT UNFORGETTABLY SHE ISN'T ACCEPTING NEW PT'S FOR ABOUT A YEAR OUT AND OFFERED HER OTHER OPTIONS, SHE WAS UPSET BECAUSE THAT WASN'T THE SITUATION BEFORE. ALSO INFORMED PT THAT THEY MIGHT AND MIGHT NOT REFILL HER RX BECAUSE SHE HASN'T BEEN SEEN IN ALMOST A YEAR LOV; 06/18/24. TOLD HER I WILL STILL SEND IT TO THE COVERING PROVIDER. PT HAS NOT FOUND NEW PCP